# Patient Record
Sex: MALE | Race: BLACK OR AFRICAN AMERICAN | NOT HISPANIC OR LATINO | Employment: FULL TIME | URBAN - METROPOLITAN AREA
[De-identification: names, ages, dates, MRNs, and addresses within clinical notes are randomized per-mention and may not be internally consistent; named-entity substitution may affect disease eponyms.]

---

## 2019-06-15 ENCOUNTER — OFFICE VISIT (OUTPATIENT)
Dept: URGENT CARE | Facility: URGENT CARE | Age: 44
End: 2019-06-15
Payer: COMMERCIAL

## 2019-06-15 ENCOUNTER — ANCILLARY PROCEDURE (OUTPATIENT)
Dept: GENERAL RADIOLOGY | Facility: CLINIC | Age: 44
End: 2019-06-15
Attending: PHYSICIAN ASSISTANT
Payer: COMMERCIAL

## 2019-06-15 VITALS
OXYGEN SATURATION: 98 % | WEIGHT: 166.4 LBS | SYSTOLIC BLOOD PRESSURE: 133 MMHG | TEMPERATURE: 98.2 F | DIASTOLIC BLOOD PRESSURE: 91 MMHG | HEART RATE: 67 BPM

## 2019-06-15 DIAGNOSIS — M54.42 LEFT-SIDED LOW BACK PAIN WITH LEFT-SIDED SCIATICA, UNSPECIFIED CHRONICITY: ICD-10-CM

## 2019-06-15 DIAGNOSIS — M54.42 LEFT-SIDED LOW BACK PAIN WITH LEFT-SIDED SCIATICA, UNSPECIFIED CHRONICITY: Primary | ICD-10-CM

## 2019-06-15 PROCEDURE — 99203 OFFICE O/P NEW LOW 30 MIN: CPT | Performed by: PHYSICIAN ASSISTANT

## 2019-06-15 PROCEDURE — 72100 X-RAY EXAM L-S SPINE 2/3 VWS: CPT

## 2019-06-15 RX ORDER — CYCLOBENZAPRINE HCL 10 MG
10 TABLET ORAL 3 TIMES DAILY PRN
Qty: 20 TABLET | Refills: 0 | Status: SHIPPED | OUTPATIENT
Start: 2019-06-15 | End: 2019-09-22

## 2019-06-15 RX ORDER — PREDNISONE 20 MG/1
40 TABLET ORAL DAILY
Qty: 10 TABLET | Refills: 0 | Status: SHIPPED | OUTPATIENT
Start: 2019-06-15 | End: 2019-06-20

## 2019-06-15 NOTE — PROGRESS NOTES
SUBJECTIVE:   Demario Tavarez is a 43 year old male presenting with a chief complaint of   Chief Complaint   Patient presents with     LOW BACK PAIN RADIATE DOWN LEG      LOW BACK PAIN WHICH RADIATES DOWN LEG OFF AND ONGOING FOR SOME TIME THIS FLARE UP HAS BEEN ONGOING FOR 2 DAYS       He is a new patient of [facility].    Back Pain    Onset of symptoms was 2 day(s) ago.  Location: left low back  Radiation: radiates into the left buttocks and radiates into the left leg  Context:       The injury happened while: no history of injury, has been sitting on wallet in left back pocket which may have irritated the back      Mechanism: none recalled by the patient      Patient experienced delayed pain  Course of symptoms is same.    Severity moderately severe  Current and Associated symptoms: pain, reports nerve pain in left leg  Denies: fecal incontinence, urinary incontinence, lower extremity numbness, lower extremity weakness and paresthesia    Aggravating Factors: sitting, driving  Therapies to improve symptoms include: ibuprofen  Past history: recurrent self limited episodes of low back pain in the past    Review of Systems   ROS: 10 point ROS neg other than the symptoms noted above in the HPI.    No past medical history on file.   History of low back pain     No family history on file.     Current Outpatient Medications   Medication Sig Dispense Refill     cyclobenzaprine (FLEXERIL) 10 MG tablet Take 1 tablet (10 mg) by mouth 3 times daily as needed for muscle spasms 20 tablet 0     predniSONE (DELTASONE) 20 MG tablet Take 2 tablets (40 mg) by mouth daily for 5 days 10 tablet 0     Social History     Tobacco Use     Smoking status: Not on file   Substance Use Topics     Alcohol use: Not on file       OBJECTIVE  BP (!) 133/91   Pulse 67   Temp 98.2  F (36.8  C)   Wt 75.5 kg (166 lb 6.4 oz)   SpO2 98%     Physical Exam   Constitutional: He is oriented to person, place, and time. He appears well-developed and  well-nourished.   HENT:   Head: Normocephalic and atraumatic.   Right Ear: External ear normal.   Left Ear: External ear normal.   Nose: Nose normal.   Mouth/Throat: Uvula is midline, oropharynx is clear and moist and mucous membranes are normal.   Eyes: Conjunctivae and EOM are normal.   Neck: Normal range of motion.   Cardiovascular: Normal rate, regular rhythm and normal heart sounds.   Pulmonary/Chest: Effort normal and breath sounds normal.   Abdominal: There is no CVA tenderness.   Musculoskeletal:        Lumbar back: He exhibits decreased range of motion. He exhibits no tenderness and no bony tenderness.   ROM limited with forward flexion. Seated position exacerbates the pain. No midline tenderness. Normal strength.    Neurological: He is alert and oriented to person, place, and time. He has normal strength and normal reflexes. No sensory deficit.   Skin: Skin is warm and dry.   Psychiatric: He has a normal mood and affect. His behavior is normal.   Nursing note and vitals reviewed.      Labs:  No results found for this or any previous visit (from the past 24 hour(s)).    X-Ray was done, my findings are: negative for fracture or misalignment of vertebrae.    ASSESSMENT:      ICD-10-CM    1. Left-sided low back pain with left-sided sciatica, unspecified chronicity M54.42 XR Lumbar Spine 2/3 Views     PHYSICAL THERAPY REFERRAL     cyclobenzaprine (FLEXERIL) 10 MG tablet     predniSONE (DELTASONE) 20 MG tablet        Medical Decision Making:    Differential Diagnosis:  Back Pain: myofascial low back strain, lumbosacral strain, degenerative disc disease, possible herniated disc , acute sciatica, compression fracture and chronic low back pain    Serious Comorbid Conditions:  Adult:  None    PLAN:    Back Pain: ice, heat, rest, stretching, Physical Therapy, Tylenol, Ibuprofen and Rx: prednisone and flexeril    Followup:    If not improving or if condition worsens, follow up with your Primary Care  Provider    Patient Instructions   Demario to follow up with Primary Care provider regarding elevated blood pressure.      Patient Education     Exercises to Strengthen Your Lower Back  Strong lower back and abdominal muscles work together to support your spine. The exercises below will help strengthen the lower back. It is important that you begin exercising slowly and increase levels gradually.  Always begin any exercise program with stretching. If you feel pain while doing any of these exercises, stop and talk to your doctor about a more specific exercise program that better suits your condition.   Low back stretch  The point of stretching is to make you more flexible and increase your range of motion. Stretch only as much as you are able. Stretch slowly. Do not push your stretch to the limit. If at any point you feel pain while stretching, this is your (temporary) limit.    Lie on your back with your knees bent and both feet on the ground.    Slowly raise your left knee to your chest as you flatten your lower back against the floor. Hold for 5 seconds.    Relax and repeat the exercise with your right knee.    Do 10 of these exercises for each leg.    Repeat hugging both knees to your chest at the same time.  Building lower back strength  Start your exercise routine with 10 to 30 minutes a day, 1 to 3 times a day.  Initial exercises  Lying on your back:  1. Ankle pumps: Move your foot up and down, towards your head, and then away. Repeat 10 times with each foot.  2. Heel slides: Slowly bend your knee, drawing the heel of your foot towards you. Then slide your heel/foot from you, straightening your knee. Do not lift your foot off the floor (this is not a leg lift).  3. Abdominal contraction: Bend your knees and put your hands on your stomach. Tighten your stomach muscles. Hold for 5 seconds, then relax. Repeat 10 times.  4. Straight leg raise: Bend one leg at the knee and keep the other leg straight. Tighten your  stomach muscles. Slowly lift your straight leg 6 to 12 inches off the floor and hold for up to 5 seconds. Repeat 10 times on each side.  Standin. Wall squats: Stand with your back against the wall. Move your feet about 12 inches away from the wall. Tighten your stomach muscles, and slowly bend your knees until they are at about a 45 degree angle. Do not go down too far. Hold about 5 seconds. Then slowly return to your starting position. Repeat 10 times.  2. Heel raises: Stand facing the wall. Slowly raise the heels of your feet up and down, while keeping your toes on the floor. If you have trouble balancing, you can touch the wall with your hands. Repeat 10 times.  More advanced exercises  When you feel comfortable enough, try these exercises.  1. Kneeling lumbar extension: Begin on your hands and knees. At the same time, raise and straighten your right arm and left leg until they are parallel to the ground. Hold for 2 seconds and come back slowly to a starting position. Repeat with left arm and right leg, alternating 10 times.  2. Prone lumbar extension: Lie face down, arms extended overhead, palms on the floor. At the same time, raise your right arm and left leg as high as comfortably possible. Hold for 10 seconds and slowly return to start. Repeat with left arm and right leg, alternating 10 times. Gradually build up to 20 times. (Advanced: Repeat this exercise raising both arms and both legs a few inches off the floor at the same time. Hold for 5 seconds and release.)  3. Pelvic tilt: Lie on the floor on your back with your knees bent at 90 degrees. Your feet should be flat on the floor. Inhale, exhale, then slowly contract your abdominal muscles bringing your navel toward your spine. Let your pelvis rock back until your lower back is flat on the floor. Hold for 10 seconds while breathing smoothly.  4. Abdominal crunch: Perform a pelvic tilt (above) flattening your lower back against the floor. Holding the  tension in your abdominal muscles, take another breath and raise your shoulder blades off the ground (this is not a full sit-up). Keep your head in line with your body (don t bend your neck forward). Hold for 2 seconds, then slowly lower.  Date Last Reviewed: 6/1/2016 2000-2018 [employer]. [address] [place] [address], [place] [address]. All rights reserved. This information is not intended as a substitute for professional medical care. Always follow your healthcare professional's instructions.               Nolberto Khalil PA-C

## 2019-06-15 NOTE — PATIENT INSTRUCTIONS
Demario to follow up with Primary Care provider regarding elevated blood pressure.      Patient Education     Exercises to Strengthen Your Lower Back  Strong lower back and abdominal muscles work together to support your spine. The exercises below will help strengthen the lower back. It is important that you begin exercising slowly and increase levels gradually.  Always begin any exercise program with stretching. If you feel pain while doing any of these exercises, stop and talk to your doctor about a more specific exercise program that better suits your condition.   Low back stretch  The point of stretching is to make you more flexible and increase your range of motion. Stretch only as much as you are able. Stretch slowly. Do not push your stretch to the limit. If at any point you feel pain while stretching, this is your (temporary) limit.    Lie on your back with your knees bent and both feet on the ground.    Slowly raise your left knee to your chest as you flatten your lower back against the floor. Hold for 5 seconds.    Relax and repeat the exercise with your right knee.    Do 10 of these exercises for each leg.    Repeat hugging both knees to your chest at the same time.  Building lower back strength  Start your exercise routine with 10 to 30 minutes a day, 1 to 3 times a day.  Initial exercises  Lying on your back:  1. Ankle pumps: Move your foot up and down, towards your head, and then away. Repeat 10 times with each foot.  2. Heel slides: Slowly bend your knee, drawing the heel of your foot towards you. Then slide your heel/foot from you, straightening your knee. Do not lift your foot off the floor (this is not a leg lift).  3. Abdominal contraction: Bend your knees and put your hands on your stomach. Tighten your stomach muscles. Hold for 5 seconds, then relax. Repeat 10 times.  4. Straight leg raise: Bend one leg at the knee and keep the other leg straight. Tighten your stomach muscles. Slowly lift your  straight leg 6 to 12 inches off the floor and hold for up to 5 seconds. Repeat 10 times on each side.  Standin. Wall squats: Stand with your back against the wall. Move your feet about 12 inches away from the wall. Tighten your stomach muscles, and slowly bend your knees until they are at about a 45 degree angle. Do not go down too far. Hold about 5 seconds. Then slowly return to your starting position. Repeat 10 times.  2. Heel raises: Stand facing the wall. Slowly raise the heels of your feet up and down, while keeping your toes on the floor. If you have trouble balancing, you can touch the wall with your hands. Repeat 10 times.  More advanced exercises  When you feel comfortable enough, try these exercises.  1. Kneeling lumbar extension: Begin on your hands and knees. At the same time, raise and straighten your right arm and left leg until they are parallel to the ground. Hold for 2 seconds and come back slowly to a starting position. Repeat with left arm and right leg, alternating 10 times.  2. Prone lumbar extension: Lie face down, arms extended overhead, palms on the floor. At the same time, raise your right arm and left leg as high as comfortably possible. Hold for 10 seconds and slowly return to start. Repeat with left arm and right leg, alternating 10 times. Gradually build up to 20 times. (Advanced: Repeat this exercise raising both arms and both legs a few inches off the floor at the same time. Hold for 5 seconds and release.)  3. Pelvic tilt: Lie on the floor on your back with your knees bent at 90 degrees. Your feet should be flat on the floor. Inhale, exhale, then slowly contract your abdominal muscles bringing your navel toward your spine. Let your pelvis rock back until your lower back is flat on the floor. Hold for 10 seconds while breathing smoothly.  4. Abdominal crunch: Perform a pelvic tilt (above) flattening your lower back against the floor. Holding the tension in your abdominal muscles,  take another breath and raise your shoulder blades off the ground (this is not a full sit-up). Keep your head in line with your body (don t bend your neck forward). Hold for 2 seconds, then slowly lower.  Date Last Reviewed: 6/1/2016 2000-2018 [employer]. [address] [place] [address], [place] [address]. All rights reserved. This information is not intended as a substitute for professional medical care. Always follow your healthcare professional's instructions.

## 2019-09-22 ENCOUNTER — HOSPITAL ENCOUNTER (EMERGENCY)
Facility: CLINIC | Age: 44
Discharge: HOME | End: 2019-09-22
Attending: EMERGENCY MEDICINE | Admitting: EMERGENCY MEDICINE
Payer: COMMERCIAL

## 2019-09-22 ENCOUNTER — APPOINTMENT (OUTPATIENT)
Dept: GENERAL RADIOLOGY | Facility: CLINIC | Age: 44
End: 2019-09-22
Attending: EMERGENCY MEDICINE
Payer: COMMERCIAL

## 2019-09-22 VITALS
TEMPERATURE: 98.2 F | RESPIRATION RATE: 16 BRPM | OXYGEN SATURATION: 100 % | DIASTOLIC BLOOD PRESSURE: 77 MMHG | SYSTOLIC BLOOD PRESSURE: 125 MMHG | HEART RATE: 68 BPM

## 2019-09-22 DIAGNOSIS — M84.361A STRESS FRACTURE OF RIGHT TIBIA, INITIAL ENCOUNTER: ICD-10-CM

## 2019-09-22 DIAGNOSIS — M54.31 RIGHT SIDED SCIATICA: ICD-10-CM

## 2019-09-22 PROCEDURE — 99283 EMERGENCY DEPT VISIT LOW MDM: CPT

## 2019-09-22 PROCEDURE — 73590 X-RAY EXAM OF LOWER LEG: CPT | Mod: RT

## 2019-09-22 PROCEDURE — 25000132 ZZH RX MED GY IP 250 OP 250 PS 637: Performed by: EMERGENCY MEDICINE

## 2019-09-22 PROCEDURE — 25000131 ZZH RX MED GY IP 250 OP 636 PS 637: Performed by: EMERGENCY MEDICINE

## 2019-09-22 RX ORDER — PREDNISONE 20 MG/1
60 TABLET ORAL DAILY
Qty: 10 TABLET | Refills: 0 | Status: SHIPPED | OUTPATIENT
Start: 2019-09-22 | End: 2019-09-26

## 2019-09-22 RX ORDER — PREDNISONE 20 MG/1
60 TABLET ORAL ONCE
Status: COMPLETED | OUTPATIENT
Start: 2019-09-22 | End: 2019-09-22

## 2019-09-22 RX ORDER — TRAMADOL HYDROCHLORIDE 50 MG/1
50 TABLET ORAL EVERY 6 HOURS PRN
Qty: 10 TABLET | Refills: 0 | Status: SHIPPED | OUTPATIENT
Start: 2019-09-22 | End: 2019-09-22

## 2019-09-22 RX ORDER — TRAMADOL HYDROCHLORIDE 50 MG/1
50 TABLET ORAL EVERY 6 HOURS PRN
Qty: 10 TABLET | Refills: 0 | Status: SHIPPED | OUTPATIENT
Start: 2019-09-22

## 2019-09-22 RX ORDER — CYCLOBENZAPRINE HCL 10 MG
10 TABLET ORAL 3 TIMES DAILY PRN
Qty: 20 TABLET | Refills: 0 | Status: SHIPPED | OUTPATIENT
Start: 2019-09-22 | End: 2019-09-28

## 2019-09-22 RX ORDER — DIAZEPAM 5 MG
10 TABLET ORAL ONCE
Status: COMPLETED | OUTPATIENT
Start: 2019-09-22 | End: 2019-09-22

## 2019-09-22 RX ORDER — ACETAMINOPHEN 500 MG
1000 TABLET ORAL ONCE
Status: COMPLETED | OUTPATIENT
Start: 2019-09-22 | End: 2019-09-22

## 2019-09-22 RX ORDER — OXYCODONE HYDROCHLORIDE 5 MG/1
5 TABLET ORAL ONCE
Status: COMPLETED | OUTPATIENT
Start: 2019-09-22 | End: 2019-09-22

## 2019-09-22 RX ADMIN — PREDNISONE 60 MG: 20 TABLET ORAL at 16:48

## 2019-09-22 RX ADMIN — ACETAMINOPHEN 1000 MG: 500 TABLET, FILM COATED ORAL at 16:48

## 2019-09-22 RX ADMIN — OXYCODONE HYDROCHLORIDE 5 MG: 5 TABLET ORAL at 16:48

## 2019-09-22 RX ADMIN — DIAZEPAM 10 MG: 5 TABLET ORAL at 16:48

## 2019-09-22 ASSESSMENT — ENCOUNTER SYMPTOMS
NUMBNESS: 1
MYALGIAS: 1

## 2019-09-22 NOTE — ED PROVIDER NOTES
History     Chief Complaint:  Leg Pain    HPI   Demario Tavarez is a 44 year old male who presents to the emergency department for evaluation of leg pain. The patient reports he was running on a treadmill last week on 9/17. He states after the exercise, he developed onset of mild right calf and thigh pain. He visited a massage therapist on 9/20 with no improvement to his symptoms, but they remained stable for the remainder of the week. However, today, he developed worsening of his right leg pain, accompanied by numbness throughout his right leg. The patient rates his pain as a 6/10 currently, worse with movement of the leg, and he does have difficulty ambulating secondary to his pain. He denies any numbness to his foot.    Allergies:  NKDA     Medications:    Flexeril     Past Medical History:    The patient denies any significant past medical history.    Past Surgical History:    The patient does not have any pertinent past surgical history.    Family History:    No past pertinent family history.    Social History:  Presents with sister.  Marital Status:   [2]     Review of Systems   Musculoskeletal: Positive for gait problem and myalgias.   Neurological: Positive for numbness.   All other systems reviewed and are negative.        Physical Exam     Patient Vitals for the past 24 hrs:   BP Temp Temp src Heart Rate SpO2   09/22/19 1633 -- 98.2  F (36.8  C) Oral -- --   09/22/19 1631 (!) 154/87 -- -- 75 94 %     Physical Exam  Constitutional: Well developed, nontox appearance  Head: Atraumatic.   Mouth/Throat: Oropharynx is clear and moist.   Neck:  no stridor  Eyes: no scleral icterus  Cardiovascular: RRR, 2+ bilat radial, R DP and PT pulses  Pulmonary/Chest: nml resp effort, Clear BS bilat  Abdominal: ND, +BS, soft, NT, no rebound or guarding   Back: no T or L spine tenderness, mild reproducible pain to R buttocks  : no CVA tenderness bilat  Ext: Warm, well perfused, no edema   RLE: proximal lateral lower  leg tenderness w/o erythema or edema, no crepitus, CMS and nml ROM distally; pain with ipsilateral R straight leg raise with assoc radiation distally  Neurological: A&O, symmetric facies, moves ext x4, 5/5 strength to bilat LEs, sensation intact  Skin: Skin is warm and dry.   Psychiatric: Behavior is normal. Thought content normal.   Nursing note and vitals reviewed.    Emergency Department Course     Imaging:  Radiographic findings were communicated with the patient who voiced understanding of the findings.    XR Tibia & Fibula Right 2 Views:  There is very minimal periosteal reaction along the lateral mid shaft of the tibia compatible with stress reaction. A cortical stress fracture is not visible.  As per radiology.    Interventions:  1648 Valium 10 mg PO   Roxicodone 5 mg PO   Tylenol 1000 mg PO   Deltasone 60 mg PO    Emergency Department Course:  Nursing notes and vitals reviewed. 1635 I performed an exam of the patient as documented above.     Medicine administered as documented above. Blood drawn. This was sent to the lab for further testing, results above.    The patient was sent for a XR Tib/Fib while in the emergency department, findings above.     1740 I rechecked the patient and discussed the results of his workup thus far.     Findings and plan explained to the Patient. Patient discharged home with instructions regarding supportive care, medications, and reasons to return. The importance of close follow-up was reviewed. The patient was prescribed Flexeril, Deltasone, Ultram.    Impression & Plan      Medical Decision Makin year old male presenting w/ RLE pain     The patient presented with back pain and radicular symptoms. The pain has improved with interventions in the emergency department. The patient did not sustain any trauma to back, therefore x-rays are not necessary due to the low likelihood of fracture or subluxation. Advanced imaging with CT/MRI is not indicated at this time, but may be  indicated in the future if symptoms fail to resolve.  The patient has not had a fever, saddle/perineal anesthesia, bilateral foot numbness, or bowel or bladder dysfunction.  There is no clinical evidence of cauda equina syndrome, discitis, spinal/epidural space hematoma or abscess. The neurological exam is normal, with the exception of the dermatomal symptoms noted above.  Given pain worse in lower right leg, screening xray ordered and demonstrated periosteal stress response.  Plan for NWB and f/u with ortho for reevaluation.  Possible early stress fracture w/ radiculopathy 2ndary to antalgic gait and posture. At this time I feel the pt is safe for discharge.  Recommendations given regarding follow up with orthopedics and return to the emergency department as needed for new or worsening symptoms.  Pt counseled on all results, disposition and diagnosis.  They are understanding and agreeable to plan. Patient discharged in stable condition.      Diagnosis:    ICD-10-CM   1. Right sided sciatica M54.31   2. Stress fracture of right tibia, initial encounter M84.361A       Disposition:  discharged to home    Discharge Medications:  New Prescriptions    CYCLOBENZAPRINE (FLEXERIL) 10 MG TABLET    Take 1 tablet (10 mg) by mouth 3 times daily as needed for muscle spasms    PREDNISONE (DELTASONE) 20 MG TABLET    Take 3 tablets (60 mg) by mouth daily for 4 days    TRAMADOL (ULTRAM) 50 MG TABLET    Take 1 tablet (50 mg) by mouth every 6 hours as needed for breakthrough pain or severe pain     Benton WILKINSON, am serving as a scribe on 9/22/2019 at 4:26 PM to personally document services performed by Rocael Villalta MD based on my observations and the provider's statements to me.     Benton Aquino  9/22/2019   [facility] EMERGENCY DEPARTMENT       Rocael Villalta MD  09/23/19 5204

## 2019-09-22 NOTE — ED TRIAGE NOTES
Pt presents w/ R leg pain that started last Tuesday while running on treadmill. Friday went to massage therapist w/ no relief. Pain in throbbing in and comes and goes. Pt partial wt bearing. CMS intact ex numbness. ABCs intact.

## 2019-09-22 NOTE — DISCHARGE INSTRUCTIONS
Please use crutches and do not put weight on your right leg until follow-up with orthopedics.    1. -Take acetaminophen 500 to 1000 mg by mouth every 4 to 6 hours as needed for pain or fever.  Do not take more than 4000 mg in 24 hours.  Do not take within 6 hours of another acetaminophen containing medication such as norco (vicodin) or percocet.  - Take ibuprofen 600 to 800 mg by mouth every 6 to 8 hours as needed for pain or fever  2. Use ice as needed for swelling.  3. Elevate extremity to help with swelling.  4. Follow-up with orthopedics in 1 week.  5. You may return to the ED as needed for new or worsening symptoms such as reinjury, severe and uncontrollable pain, focal weakness, severe numbness and tingling, any other concerning symptoms.

## 2019-09-22 NOTE — ED AVS SNAPSHOT
[facility] Emergency Department  [address]  [facility] [address]  Phone:  [phone]  Fax:  [phone]                                    Demario Tavarez   MRN: [id number]    Department:  [facility] Emergency Department   Date of Visit:  9/22/2019           After Visit Summary Signature Page    I have received my discharge instructions, and my questions have been answered. I have discussed any challenges I see with this plan with the nurse or doctor.    ..........................................................................................................................................  Patient/Patient Representative Signature      ..........................................................................................................................................  Patient Representative Print Name and Relationship to Patient    ..................................................               ................................................  Date                                   Time    ..........................................................................................................................................  Reviewed by Signature/Title    ...................................................              ..............................................  Date                                               Time          22EPIC Rev 08/18

## 2020-03-11 ENCOUNTER — HEALTH MAINTENANCE LETTER (OUTPATIENT)
Age: 45
End: 2020-03-11

## 2021-01-03 ENCOUNTER — HEALTH MAINTENANCE LETTER (OUTPATIENT)
Age: 46
End: 2021-01-03

## 2021-04-25 ENCOUNTER — HEALTH MAINTENANCE LETTER (OUTPATIENT)
Age: 46
End: 2021-04-25

## 2021-10-10 ENCOUNTER — HEALTH MAINTENANCE LETTER (OUTPATIENT)
Age: 46
End: 2021-10-10

## 2022-05-21 ENCOUNTER — HEALTH MAINTENANCE LETTER (OUTPATIENT)
Age: 47
End: 2022-05-21

## 2022-09-18 ENCOUNTER — HEALTH MAINTENANCE LETTER (OUTPATIENT)
Age: 47
End: 2022-09-18

## 2023-06-04 ENCOUNTER — HEALTH MAINTENANCE LETTER (OUTPATIENT)
Age: 48
End: 2023-06-04

## 2023-08-21 ENCOUNTER — OFFICE VISIT (OUTPATIENT)
Dept: URGENT CARE | Facility: URGENT CARE | Age: 48
End: 2023-08-21
Payer: COMMERCIAL

## 2023-08-21 VITALS
TEMPERATURE: 97.6 F | OXYGEN SATURATION: 100 % | HEART RATE: 62 BPM | WEIGHT: 184.2 LBS | DIASTOLIC BLOOD PRESSURE: 79 MMHG | SYSTOLIC BLOOD PRESSURE: 124 MMHG

## 2023-08-21 DIAGNOSIS — M54.16 LUMBAR RADICULOPATHY: Primary | ICD-10-CM

## 2023-08-21 PROCEDURE — 99203 OFFICE O/P NEW LOW 30 MIN: CPT | Performed by: INTERNAL MEDICINE

## 2023-08-21 RX ORDER — PREDNISONE 20 MG/1
TABLET ORAL
Qty: 18 TABLET | Refills: 0 | Status: SHIPPED | OUTPATIENT
Start: 2023-08-21 | End: 2023-08-30

## 2023-08-21 NOTE — PATIENT INSTRUCTIONS
This pain is consistent with a pinched nerve in the low back that is the source of the pain.  We will try treatment with a course of steroid medicine.  If this is not helping then would be good to again see the people who treated your back in the past.

## 2023-08-21 NOTE — PROGRESS NOTES
Assessment & Plan     Lumbar radiculopathy  Patient Instructions   This pain is consistent with a pinched nerve in the low back that is the source of the pain.  We will try treatment with a course of steroid medicine.  If this is not helping then would be good to again see the people who treated your back in the past.   - predniSONE (DELTASONE) 20 MG tablet; Take 3 tablets (60 mg) by mouth daily for 3 days, THEN 2 tablets (40 mg) daily for 3 days, THEN 1 tablet (20 mg) daily for 3 days.    Byron Sahu MD  [facility] WILLIAM Hanks is a 47 year old, presenting for the following health issues:  Leg Pain (Start 2 days no injury sx start in right bottom radiates down in calf, current-5-6/10 worst- 7-8/10, intermittent hx back surgery in 2020 tx Tylenol )    HPI   Noting some  pain in the posterior right leg.  This is running from the right gluteal region to the lower leg.   Better when sitting, worse with standing. This has been present for about one week. Worse in the morning.  Better with activity. He does have a prior history of herniated disc and back surgery.  Has some lingering numbness of the right  lower extremity from that.     Review of Systems   ROS:  The following systems have been completely reviewed and are negative except as noted in the HPI: CONSTITUTIONAL, MUSCULOSKELETAL, and NEUROLOGIC       Objective    /79   Pulse 62   Temp 97.6  F (36.4  C)   Wt 83.6 kg (184 lb 3.2 oz)   SpO2 100%   There is no height or weight on file to calculate BMI.  Physical Exam   GENERAL APPEARANCE: healthy, alert, and no distress  M/SKEL: there is not bony midline tenderness over the lumbar spine; there is not tenderness to palpation in the bilateral lumbar paraspinal muscles; there is pain with  extension of the  lumbar spine; Straight Leg Raise is positive on the right  NEURO: Strength:           Hip Flexors 5/5 Right, 5/5 Left -           Hip Extensors 5/5 Right, 5/5  Left -           Knee Flexors 5/5 Right, 5/5 Left -           Knee Extensors 5/5 Right, 5/5 Left -           Plantarflexors 5/5 Right, 5/5 Left -           Dorsiflexors 5/5 Right, 5/5 Left -  Reflexes:          Patellar 1+ Right, 1+ Left          Achilles 1+ Right, 1+ Left  Sensation:         diminished to monofilament  right  L5 dermatome

## 2024-02-25 ENCOUNTER — HEALTH MAINTENANCE LETTER (OUTPATIENT)
Age: 49
End: 2024-02-25

## 2025-03-15 ENCOUNTER — HEALTH MAINTENANCE LETTER (OUTPATIENT)
Age: 50
End: 2025-03-15